# Patient Record
Sex: MALE | Race: WHITE | NOT HISPANIC OR LATINO | Employment: OTHER | URBAN - NONMETROPOLITAN AREA
[De-identification: names, ages, dates, MRNs, and addresses within clinical notes are randomized per-mention and may not be internally consistent; named-entity substitution may affect disease eponyms.]

---

## 2017-01-10 RX ORDER — GABAPENTIN 300 MG/1
300 CAPSULE ORAL
Qty: 90 CAP | Refills: 1 | Status: SHIPPED | OUTPATIENT
Start: 2017-01-10

## 2017-05-26 RX ORDER — MECOBAL/LEVOMEFOLAT CA/B6 PHOS 2-3-35 MG
TABLET ORAL
Qty: 180 TAB | Refills: 0 | Status: SHIPPED | OUTPATIENT
Start: 2017-05-26 | End: 2017-06-02 | Stop reason: SDUPTHER

## 2017-06-02 RX ORDER — MECOBAL/LEVOMEFOLAT CA/B6 PHOS 2-3-35 MG
1 TABLET ORAL 2 TIMES DAILY
Qty: 180 TAB | Refills: 0 | Status: SHIPPED | OUTPATIENT
Start: 2017-06-02

## 2017-07-17 ENCOUNTER — APPOINTMENT (OUTPATIENT)
Dept: MEDICAL GROUP | Facility: PHYSICIAN GROUP | Age: 77
End: 2017-07-17
Payer: MEDICARE

## 2017-11-07 ENCOUNTER — PATIENT OUTREACH (OUTPATIENT)
Dept: HEALTH INFORMATION MANAGEMENT | Facility: OTHER | Age: 77
End: 2017-11-07

## 2017-11-07 NOTE — PROGRESS NOTES
Attempt #:1    WebIZ Checked & Epic Updated: Yes  · WebIZ Recommendations: FLU, PREVNAR (PCV13) , TDAP and ZOSTAVAX (Shingles)  · Is patient due for Tdap? YES. Patient was not notified of copay/out of pocket cost.  · Is patient due for Shingles? YES. Patient was not notified of copay/out of pocket cost.  [facility]Connect Verified: no  Verify PCP: yes    Communication Preference Obtained: yes     Review Care Team: yes    Annual Wellness Visit Scheduling  1. Scheduling Status:Scheduled       Care Gap Scheduling (Attempt to Schedule EACH Overdue Care Gap!)     Health Maintenance Due   Topic Date Due   • IMM DTaP/Tdap/Td Vaccine (1 - Tdap) 12/15/1959   • IMM ZOSTER VACCINE  12/15/1998   • IMM PNEUMOCOCCAL 65+ (ADULT) LOW/MEDIUM RISK SERIES (1 of 2 - PCV13) 12/15/2003   • Annual Wellness Visit  12/22/2016   • IMM INFLUENZA (1) 09/01/2017        Scheduled patient for Annual Wellness Visit and Immunizations: FLU, PREVNAR (PCV13) , TDAP and ZOSTAVAX (Shingles)       Clerts! Activation: declined  Clerts! Miya: no  Virtual Visits: no  Opt In to Text Messages: no

## 2017-12-14 ENCOUNTER — OFFICE VISIT (OUTPATIENT)
Dept: MEDICAL GROUP | Facility: PHYSICIAN GROUP | Age: 77
End: 2017-12-14
Payer: MEDICARE

## 2017-12-14 ENCOUNTER — APPOINTMENT (OUTPATIENT)
Dept: MEDICAL GROUP | Facility: PHYSICIAN GROUP | Age: 77
End: 2017-12-14
Payer: MEDICARE

## 2017-12-14 VITALS
HEART RATE: 80 BPM | HEIGHT: 68 IN | SYSTOLIC BLOOD PRESSURE: 162 MMHG | DIASTOLIC BLOOD PRESSURE: 74 MMHG | BODY MASS INDEX: 31.67 KG/M2 | RESPIRATION RATE: 16 BRPM | TEMPERATURE: 97.9 F | OXYGEN SATURATION: 94 % | WEIGHT: 209 LBS

## 2017-12-14 DIAGNOSIS — Z00.00 MEDICARE ANNUAL WELLNESS VISIT, SUBSEQUENT: Primary | ICD-10-CM

## 2017-12-14 DIAGNOSIS — R73.03 PREDIABETES: ICD-10-CM

## 2017-12-14 DIAGNOSIS — M54.5 CHRONIC LOW BACK PAIN, UNSPECIFIED BACK PAIN LATERALITY, WITH SCIATICA PRESENCE UNSPECIFIED: ICD-10-CM

## 2017-12-14 DIAGNOSIS — I67.9 CEREBRAL VASCULAR DISEASE: ICD-10-CM

## 2017-12-14 DIAGNOSIS — M54.16 LUMBAR RADICULOPATHY: ICD-10-CM

## 2017-12-14 DIAGNOSIS — I10 ESSENTIAL HYPERTENSION: ICD-10-CM

## 2017-12-14 DIAGNOSIS — G25.81 RLS (RESTLESS LEGS SYNDROME): ICD-10-CM

## 2017-12-14 DIAGNOSIS — F03.90 DEMENTIA WITHOUT BEHAVIORAL DISTURBANCE, UNSPECIFIED DEMENTIA TYPE: ICD-10-CM

## 2017-12-14 DIAGNOSIS — R29.898 LEFT HAND WEAKNESS: ICD-10-CM

## 2017-12-14 DIAGNOSIS — G57.93 NEUROPATHY OF BOTH FEET: ICD-10-CM

## 2017-12-14 DIAGNOSIS — Z86.73 HISTORY OF STROKE: ICD-10-CM

## 2017-12-14 DIAGNOSIS — G89.29 CHRONIC LOW BACK PAIN, UNSPECIFIED BACK PAIN LATERALITY, WITH SCIATICA PRESENCE UNSPECIFIED: ICD-10-CM

## 2017-12-14 DIAGNOSIS — I65.21 STENOSIS OF RIGHT CAROTID ARTERY: ICD-10-CM

## 2017-12-14 PROCEDURE — G0439 PPPS, SUBSEQ VISIT: HCPCS | Performed by: NURSE PRACTITIONER

## 2017-12-14 RX ORDER — DONEPEZIL HYDROCHLORIDE 5 MG/1
5 TABLET, FILM COATED ORAL
Qty: 90 TAB | Refills: 1 | Status: SHIPPED | OUTPATIENT
Start: 2017-12-14

## 2017-12-14 ASSESSMENT — PATIENT HEALTH QUESTIONNAIRE - PHQ9: CLINICAL INTERPRETATION OF PHQ2 SCORE: 0

## 2017-12-14 NOTE — PROGRESS NOTES
Chief Complaint   Patient presents with   • Annual Exam     AWV         HPI:  Adonis is a 76 y.o. here for Medicare Annual Wellness Visit    Cerebral vascular disease  Mrs. a chronic condition, stable. Patient does have past history of stroke, however he does not recall having this. Does not appear he is followed by neurology.    Chronic low back pain  This is a chronic condition, he has low back pain with radiculopathy. However he is not complaining of pain today. He is supposed to be on gabapentin for this. It does not appear he is taking medications consistently.    Dementia without behavioral disturbance  This is a chronic condition. He continues on Aricept 5 mg daily. He does not recall any acute changes, however he did come to this appointment without his wife.    Essential hypertension  This is a chronic condition, uncontrolled. Patient not taking medications consistently.    History of stroke  Patient does have history of stroke in 2007. He does have residual left hand weakness and does continue on Plavix. However upon discussing this with patient, it appears he is not taking medications consistently. He is going to follow-up for medication management appointment    Left hand weakness  See additional note on history of stroke.    Lumbar radiculopathy  This is chronic, stable. He usually takes gabapentin for this, but he has not been taking medications consistently.    Neuropathy of both feet  This is a chronic condition, status of control unknown as patient does not report neuropathy of bilateral feet. He is supposed to be on gabapentin but is not taking consistently.    Prediabetes  Patient has history of prediabetes, status of control unknown as he has not had labs done for 2 years. The last time this was checked in 2016 it was 5.6%. He is due for labs.    RLS (restless legs syndrome)  This is a chronic condition, status of control unknown. He does not report taking medications consistently. He does not  report symptoms of restless leg syndrome.    Stenosis of right carotid artery  This is a chronic condition, he is unclear whether or not he is followed by cardiology. Patient does have dementia and does present to his appointment alone without his wife. He does deny symptoms though.        Patient Active Problem List    Diagnosis Date Noted   • Neuropathy of both feet 05/31/2016   • Essential hypertension 04/25/2016   • History of stroke 07/17/2015   • Left hand weakness 07/17/2015   • Prediabetes 07/17/2015   • Chronic low back pain 07/17/2015   • Lumbar radiculopathy 07/17/2015   • RLS (restless legs syndrome) 07/17/2015   • Dementia without behavioral disturbance 07/17/2015   • Cerebral vascular disease 07/17/2015   • Stenosis of right carotid artery 07/17/2015       Current Outpatient Prescriptions   Medication Sig Dispense Refill   • L-Methylfolate-B6-B12 3-35-2 MG Tab Take 1 Tab by mouth 2 Times a Day. 180 Tab 0   • clopidogrel (PLAVIX) 75 MG Tab TAKE 1 TABLET BY MOUTH EVERY DAY 90 Tab 0   • pramipexole (MIRAPEX) 1 MG Tab Take 1 Tab by mouth every bedtime. 90 Tab 3   • metformin (GLUCOPHAGE) 500 MG Tab Take 1 Tab by mouth every day. 90 Tab 1   • donepezil (ARICEPT) 5 MG Tab Take 1 Tab by mouth every day. 90 Tab 1   • gabapentin (NEURONTIN) 300 MG Cap Take 1 Cap by mouth every day. 90 Cap 1   • chlorthalidone (HYGROTON) 25 MG Tab TAKE 1 TABLET BY MOUTH EVERY DAY 90 Tab 3   • simvastatin (ZOCOR) 40 MG Tab Take 1 Tab by mouth every evening. 90 Tab 3   • gabapentin (NEURONTIN) 300 MG Cap TAKE 1 CAPSULE BY MOUTH EVERY DAY 90 Cap 3     No current facility-administered medications for this visit.         Patient is taking medications as noted in medication list.  Current supplements as per medication list.     Allergies: Patient has no known allergies.    Current social contact/activities: NO, due to  Advancing dementia    Is patient current with immunizations? No, due for PREVNAR (PCV13) , TDAP and ZOSTAVAX  (Shingles). Patient is interested in receiving NONE today.    He  reports that he quit smoking about 34 years ago. His smoking use included Cigarettes. He has a 20.00 pack-year smoking history. He has never used smokeless tobacco. He reports that he drinks alcohol. He reports that he does not use drugs.  Counseling given: Not Answered        DPA/Advanced directive: Patient has Advanced Directive on file.     ROS:    Gait: Uses no assistive device   Ostomy: no   Other tubes: no   Amputations: no   Chronic oxygen use no   Last eye exam 2015   Wears hearing aids: no   : Denies any urinary leakage during the last 6 months      Screening:        Depression Screening    Little interest or pleasure in doing things?  0 - not at all  Feeling down, depressed, or hopeless? 0 - not at all  Patient Health Questionnaire Score: 0    If depressive symptoms identified deferred to follow up visit unless specifically addressed in assessment and plan.    Interpretation of PHQ-9 Total Score   Score Severity   1-4 No Depression   5-9 Mild Depression   10-14 Moderate Depression   15-19 Moderately Severe Depression   20-27 Severe Depression    Screening for Cognitive Impairment    Three Minute Recall (apple, watch, estefania)  0/3    Draw clock face with all 12 numbers set to the hand to show 10 minutes past 11 o'clock  1    If cognitive concerns identified, deferred for follow up unless specifically addressed in assessment and plan.    Fall Risk Assessment    Has the patient had two or more falls in the last year or any fall with injury in the last year?  No  If fall risk identified, deferred for follow up unless specifically addressed in assessment and plan.    Safety Assessment    Throw rugs on floor.  No  Handrails on all stairs.  No  Good lighting in all hallways.  Yes  Difficulty hearing.  No  Patient counseled about all safety risks that were identified.    Functional Assessment ADLs    Are there any barriers preventing you from  cooking for yourself or meeting nutritional needs?  No.    Are there any barriers preventing you from driving safely or obtaining transportation?  No.    Are there any barriers preventing you from using a telephone or calling for help?  No.    Are there any barriers preventing you from shopping?  No.    Are there any barriers preventing you from taking care of your own finances?  No.    Are there any barriers preventing you from managing your medications?  No.    Are you currently engaging any exercise or physical activity?  Yes.  Walking    Health Maintenance Summary                IMM DTaP/Tdap/Td Vaccine Overdue 12/15/1957     IMM ZOSTER VACCINE Overdue 12/15/1998     IMM PNEUMOCOCCAL 65+ (ADULT) LOW/MEDIUM RISK SERIES Overdue 12/15/2003     Annual Wellness Visit Overdue 12/22/2016      Done 12/22/2015      Patient has more history with this topic...    IMM INFLUENZA Overdue 9/1/2017           Patient Care Team:  TRISHA Russell as PCP - General (Family Medicine)    Social History   Substance Use Topics   • Smoking status: Former Smoker     Packs/day: 1.00     Years: 20.00     Types: Cigarettes     Quit date: 7/1/1983   • Smokeless tobacco: Never Used   • Alcohol use Yes      Comment: wine, 1-2 glasses a day     Family History   Problem Relation Age of Onset   • Diabetes Mother    • Diabetes Sister    • Cancer Neg Hx    • Heart Disease Neg Hx    • Stroke Neg Hx      He  has a past medical history of Allergy, unspecified not elsewhere classified; GERD (gastroesophageal reflux disease); Hyperlipidemia; Hypertension; Pneumonia; Psychiatric problem; Stroke (CMS-HCC) (2007); Type II or unspecified type diabetes mellitus without mention of complication, not stated as uncontrolled; and Unspecified cataract.   Past Surgical History:   Procedure Laterality Date   • VENTRAL HERNIA REPAIR N/A 8/24/2015    Procedure: VENTRAL HERNIA REPAIR;  Surgeon: John H Ganser, M.D.;  Location: SURGERY [facility];   "Service:    • MASS EXCISION GENERAL Right 8/24/2015    Procedure: MASS EXCISION GENERAL-SUBCUTANEOUS NECK;  Surgeon: John H Ganser, M.D.;  Location: SURGERY [facility];  Service:    • CHOLECYSTECTOMY     • HERNIA REPAIR     • KNEE ARTHROPLASTY TOTAL      right           Exam:     Blood pressure (!) 162/74, pulse 80, temperature 36.6 °C (97.9 °F), resp. rate 16, height 1.715 m (5' 7.5\"), weight 94.8 kg (209 lb), SpO2 94 %. Body mass index is 32.25 kg/m².    Hearing fair.    Dentition upper and lower dentures  Alert, oriented in no acute distress.  Eye contact is good, speech goal directed, affect calm      Assessment and Plan. The following treatment and monitoring plan is recommended:  Follow up in 2 months with labs before visit, bring wife to follow up appt. Take medications as prescribed  No diagnosis found.      Services suggested: Referral to Pharmac[facility]  Health Care Screening recommendations as per orders if indicated.  Referrals offered: PT/OT/Nutrition counseling/Behavioral Health/Smoking cessation as per orders if indicated.    Discussion today about general wellness and lifestyle habits:    · Prevent falls and reduce trip hazards; Cautioned about securing or removing rugs.  · Have a working fire alarm and carbon monoxide detector;   · Engage in regular physical activity and social activities       Follow-up: No Follow-up on file.    "

## 2017-12-15 ENCOUNTER — TELEPHONE (OUTPATIENT)
Dept: MEDICAL GROUP | Facility: PHYSICIAN GROUP | Age: 77
End: 2017-12-15

## 2017-12-15 NOTE — ASSESSMENT & PLAN NOTE
This is a chronic condition. He continues on Aricept 5 mg daily. He does not recall any acute changes, however he did come to this appointment without his wife.

## 2017-12-15 NOTE — ASSESSMENT & PLAN NOTE
This is a chronic condition, status of control unknown as patient does not report neuropathy of bilateral feet. He is supposed to be on gabapentin but is not taking consistently.

## 2017-12-15 NOTE — ASSESSMENT & PLAN NOTE
Patient does have history of stroke in 2007. He does have residual left hand weakness and does continue on Plavix. However upon discussing this with patient, it appears he is not taking medications consistently. He is going to follow-up for medication management appointment

## 2017-12-15 NOTE — ASSESSMENT & PLAN NOTE
This is a chronic condition, he is unclear whether or not he is followed by cardiology. Patient does have dementia and does present to his appointment alone without his wife. He does deny symptoms though.

## 2017-12-15 NOTE — ASSESSMENT & PLAN NOTE
Patient has history of prediabetes, status of control unknown as he has not had labs done for 2 years. The last time this was checked in 2016 it was 5.6%. He is due for labs.

## 2017-12-15 NOTE — ASSESSMENT & PLAN NOTE
This is a chronic condition, he has low back pain with radiculopathy. However he is not complaining of pain today. He is supposed to be on gabapentin for this. It does not appear he is taking medications consistently.

## 2017-12-15 NOTE — ASSESSMENT & PLAN NOTE
This is chronic, stable. He usually takes gabapentin for this, but he has not been taking medications consistently.

## 2017-12-15 NOTE — ASSESSMENT & PLAN NOTE
Mrs. a chronic condition, stable. Patient does have past history of stroke, however he does not recall having this. Does not appear he is followed by neurology.

## 2017-12-15 NOTE — TELEPHONE ENCOUNTER
Adonis in office stating Laura does not have his RX from yesterday.  Contacted [facility], both RX ready for .  Adonis notified

## 2018-12-12 ENCOUNTER — PATIENT OUTREACH (OUTPATIENT)
Dept: HEALTH INFORMATION MANAGEMENT | Facility: OTHER | Age: 78
End: 2018-12-12

## 2018-12-12 NOTE — PROGRESS NOTES
Call transfered from [facility] for pre visit planning. The appt was scheduled by pt's daughter in law. Pt needs a physical for drivers license renewal I let his daughter in law know we had to change the appointment from an AWV to an annual preventative appt in order to have a physical done. Appt changed.

## 2018-12-13 ENCOUNTER — OFFICE VISIT (OUTPATIENT)
Dept: MEDICAL GROUP | Facility: PHYSICIAN GROUP | Age: 78
End: 2018-12-13
Payer: MEDICARE

## 2018-12-13 VITALS
OXYGEN SATURATION: 98 % | HEIGHT: 68 IN | SYSTOLIC BLOOD PRESSURE: 136 MMHG | HEART RATE: 76 BPM | RESPIRATION RATE: 16 BRPM | WEIGHT: 193 LBS | DIASTOLIC BLOOD PRESSURE: 78 MMHG | BODY MASS INDEX: 29.25 KG/M2 | TEMPERATURE: 97.5 F

## 2018-12-13 DIAGNOSIS — F03.90 DEMENTIA WITHOUT BEHAVIORAL DISTURBANCE, UNSPECIFIED DEMENTIA TYPE: ICD-10-CM

## 2018-12-13 DIAGNOSIS — I67.9 CEREBRAL VASCULAR DISEASE: ICD-10-CM

## 2018-12-13 DIAGNOSIS — Z86.73 HISTORY OF STROKE: ICD-10-CM

## 2018-12-13 PROCEDURE — 99214 OFFICE O/P EST MOD 30 MIN: CPT | Performed by: NURSE PRACTITIONER

## 2018-12-13 ASSESSMENT — PATIENT HEALTH QUESTIONNAIRE - PHQ9: CLINICAL INTERPRETATION OF PHQ2 SCORE: 0

## 2018-12-13 NOTE — PROGRESS NOTES
Chief Complaint   Patient presents with   • Annual Exam     DMV physical         This is a 77 y.o.male patient that presents today with the following:DMV exam    Dementia without behavioral disturbance  This is a chronic condition, appears to be progressing. Not currently followed by neurology, but is agreeable to referral. He is requesting DMV paperwork be filled out stating he is safe to drive. I have genet discussion with pt and wife stating that I am not comfortable with do this because of worsening condition until he can be seen by neurology.   Pt oriented to place and name, but continues to forget he is on medications. He is unable to do most tasks on the MMSE.    History of stroke  Pt had stroke in 2007 and continues to have residual L hand weakness, continues on Plavix. He is becoming more forgetful (see additional notes). He has been referred to neurology.      No visits with results within 1 Month(s) from this visit.   Latest known visit with results is:   Hospital Outpatient Visit on 05/26/2016   Component Date Value   • Sodium 05/26/2016 140    • Potassium 05/26/2016 4.2    • Chloride 05/26/2016 105    • Co2 05/26/2016 29    • Anion Gap 05/26/2016 6.0    • Glucose 05/26/2016 123*   • Bun 05/26/2016 24*   • Creatinine 05/26/2016 0.78    • Calcium 05/26/2016 9.2    • AST(SGOT) 05/26/2016 19    • ALT(SGPT) 05/26/2016 20    • Alkaline Phosphatase 05/26/2016 54    • Total Bilirubin 05/26/2016 0.8    • Albumin 05/26/2016 4.1    • Total Protein 05/26/2016 6.9    • Globulin 05/26/2016 2.8    • A-G Ratio 05/26/2016 1.5    • Cholesterol,Tot 05/26/2016 178    • Triglycerides 05/26/2016 154*   • HDL 05/26/2016 60    • LDL 05/26/2016 87    • Glycohemoglobin 05/26/2016 5.6    • Est Avg Glucose 05/26/2016 114    • GFR If  05/26/2016 >60    • GFR If Non  [employer]meri* 05/26/2016 >60              Past Medical History:   Diagnosis Date   • Allergy, unspecified not elsewhere classified    • GERD  (gastroesophageal reflux disease)    • Hyperlipidemia    • Hypertension    • Pneumonia     3-4 per month   • Psychiatric problem    • Stroke (HCC) 2007    TIA, left sided weakness in left hand   • Type II or unspecified type diabetes mellitus without mention of complication, not stated as uncontrolled     borderline   • Unspecified cataract        Past Surgical History:   Procedure Laterality Date   • VENTRAL HERNIA REPAIR N/A 8/24/2015    Procedure: VENTRAL HERNIA REPAIR;  Surgeon: John H Ganser, M.D.;  Location: SURGERY [facility];  Service:    • MASS EXCISION GENERAL Right 8/24/2015    Procedure: MASS EXCISION GENERAL-SUBCUTANEOUS NECK;  Surgeon: John H Ganser, M.D.;  Location: SURGERY [facility];  Service:    • CHOLECYSTECTOMY     • HERNIA REPAIR     • KNEE ARTHROPLASTY TOTAL      right       Family History   Problem Relation Age of Onset   • Diabetes Mother    • Diabetes Sister    • Cancer Neg Hx    • Heart Disease Neg Hx    • Stroke Neg Hx        Patient has no known allergies.    Current Outpatient Prescriptions Ordered in [facility]   Medication Sig Dispense Refill   • metformin (GLUCOPHAGE) 500 MG Tab Take 1 Tab by mouth every day. 90 Tab 1   • L-Methylfolate-B6-B12 3-35-2 MG Tab Take 1 Tab by mouth 2 Times a Day. 180 Tab 0   • clopidogrel (PLAVIX) 75 MG Tab TAKE 1 TABLET BY MOUTH EVERY DAY 90 Tab 0   • chlorthalidone (HYGROTON) 25 MG Tab TAKE 1 TABLET BY MOUTH EVERY DAY 90 Tab 3   • simvastatin (ZOCOR) 40 MG Tab Take 1 Tab by mouth every evening. 90 Tab 3   • pramipexole (MIRAPEX) 1 MG Tab Take 1 Tab by mouth every bedtime. 90 Tab 3   • gabapentin (NEURONTIN) 300 MG Cap TAKE 1 CAPSULE BY MOUTH EVERY DAY 90 Cap 3   • donepezil (ARICEPT) 5 MG Tab Take 1 Tab by mouth every day. 90 Tab 1   • gabapentin (NEURONTIN) 300 MG Cap Take 1 Cap by mouth every day. 90 Cap 1     No current Epic-ordered facility-administered medications on file.        Constitutional ROS: No unexpected change in weight, No unexplained  "fevers, sweats, or chills  Pulmonary ROS: No chronic cough, sputum, or hemoptysis, No shortness of breath, No recent change in breathing  Cardiovascular ROS: No chest pain  Gastrointestinal ROS: No abdominal pain, No nausea, vomiting, diarrhea, or constipation  Musculoskeletal/Extremities ROS: No clubbing, No peripheral edema, No pain, redness or swelling on the joints  Neurologic ROS: positive per HPI    Physical exam:  /78 (BP Location: Right arm, Patient Position: Sitting, BP Cuff Size: Adult)   Pulse 76   Temp 36.4 °C (97.5 °F) (Temporal)   Resp 16   Ht 1.715 m (5' 7.5\")   Wt 87.5 kg (193 lb)   SpO2 98%   BMI 29.78 kg/m²   General Appearance: alert, no distress, overweight, well groomed  Skin: Skin color, texture, turgor normal. No rashes or lesions.  Lungs: negative findings: normal respiratory rate and rhythm, lungs clear to auscultation  Heart: negative. RRR without murmur, gallop, or rubs.  No ectopy.  Abdomen: Abdomen soft, non-tender. BS normal. No masses,  No organomegaly  Musculoskeletal: negative findings: no evidence of joint instability, no evidence of muscle atrophy, no deformities present  Neurologic: intact, oriented to self and place only, scores less that 10 on MMSE    Medical decision making/discussion: pt referred to neurology for further evaluation of worsening dementia. He is to take meds as prescribed. He was given filled out form for handicap placard only    Adonis was seen today for annual exam.    Diagnoses and all orders for this visit:    Dementia without behavioral disturbance, unspecified dementia type  -     REFERRAL TO NEUROLOGY    History of stroke  -     REFERRAL TO NEUROLOGY    Cerebral vascular disease  -     REFERRAL TO NEUROLOGY          Please note that this dictation was created using voice recognition software. I have made every reasonable attempt to correct obvious errors, but I expect that there are errors of grammar and possibly content that I did not " discover before finalizing the note.

## 2018-12-15 NOTE — ASSESSMENT & PLAN NOTE
This is a chronic condition, appears to be progressing. Not currently followed by neurology, but is agreeable to referral. He is requesting DMV paperwork be filled out stating he is safe to drive. I have genet discussion with pt and wife stating that I am not comfortable with do this because of worsening condition until he can be seen by neurology.   Pt oriented to place and name, but continues to forget he is on medications. He is unable to do most tasks on the MMSE.

## 2018-12-15 NOTE — ASSESSMENT & PLAN NOTE
Pt had stroke in 2007 and continues to have residual L hand weakness, continues on Plavix. He is becoming more forgetful (see additional notes). He has been referred to neurology.

## 2019-12-06 ENCOUNTER — TELEPHONE (OUTPATIENT)
Dept: MEDICAL GROUP | Facility: PHYSICIAN GROUP | Age: 79
End: 2019-12-06

## 2019-12-06 NOTE — TELEPHONE ENCOUNTER
Daughter Marina, called concerned about father's cognitive problems. Father lives alone and is unable to care for himself. They have filed an EPS several times but nothing came out of it. Patient can become combative at times and threatens to shoot people within the last 3 month. Patient does own guns and is they're concerned. Pt does have an appt 12/9/19 with Millicent Thomas APR-N.     I called [facility] dispatch regarding the threats of shooting people and owning guns. Dispatch will be coming in to talk to discuss this matter.       Deputy Iglesias came into the office and stated that there's nothing that they can do about this because its all heresay.

## 2019-12-09 ENCOUNTER — TELEPHONE (OUTPATIENT)
Dept: MEDICAL GROUP | Facility: PHYSICIAN GROUP | Age: 79
End: 2019-12-09

## 2019-12-09 NOTE — TELEPHONE ENCOUNTER
Spoke with pt's son and daughter-in-law regarding pt and his behavioral disturbances, severe dementia. Was supposed to come in for appt today, but refused to come in. Family is very concerned, does not feel he is safe at home, has access to guns, at one point was threatening family members with gun, waving it around.   They spoke with MA last Friday, they were advised to call police, but MA actually called police to make welfare check,  came into the office to tell MA there was nothing they could do as it was hearsay.   Family was advise that if they feel he is a danger to himself or others, they need to call the police and/or ambulance, strongly recommend to deny pt access to his guns--daughter states she will not take his guns away. Discussed with them that safety for all parties is paramount and that still should consider removing guns from the home.

## 2020-06-26 ENCOUNTER — APPOINTMENT (OUTPATIENT)
Dept: RADIOLOGY | Facility: MEDICAL CENTER | Age: 80
End: 2020-06-26
Attending: EMERGENCY MEDICINE
Payer: MEDICARE

## 2020-06-26 ENCOUNTER — HOSPITAL ENCOUNTER (EMERGENCY)
Facility: MEDICAL CENTER | Age: 80
End: 2020-06-26
Attending: EMERGENCY MEDICINE
Payer: MEDICARE

## 2020-06-26 VITALS
DIASTOLIC BLOOD PRESSURE: 88 MMHG | HEART RATE: 82 BPM | RESPIRATION RATE: 18 BRPM | BODY MASS INDEX: 28.79 KG/M2 | OXYGEN SATURATION: 98 % | TEMPERATURE: 98.6 F | WEIGHT: 190 LBS | HEIGHT: 68 IN | SYSTOLIC BLOOD PRESSURE: 145 MMHG

## 2020-06-26 DIAGNOSIS — F03.91 DEMENTIA WITH BEHAVIORAL DISTURBANCE, UNSPECIFIED DEMENTIA TYPE: ICD-10-CM

## 2020-06-26 LAB
ALBUMIN SERPL BCP-MCNC: 4.1 G/DL (ref 3.2–4.9)
ALBUMIN/GLOB SERPL: 1.4 G/DL
ALP SERPL-CCNC: 74 U/L (ref 30–99)
ALT SERPL-CCNC: 12 U/L (ref 2–50)
AMMONIA PLAS-SCNC: 15 UMOL/L (ref 11–45)
ANION GAP SERPL CALC-SCNC: 9 MMOL/L (ref 7–16)
AST SERPL-CCNC: 13 U/L (ref 12–45)
BASOPHILS # BLD AUTO: 0.3 % (ref 0–1.8)
BASOPHILS # BLD: 0.02 K/UL (ref 0–0.12)
BILIRUB SERPL-MCNC: 0.5 MG/DL (ref 0.1–1.5)
BUN SERPL-MCNC: 15 MG/DL (ref 8–22)
CALCIUM SERPL-MCNC: 9.1 MG/DL (ref 8.5–10.5)
CHLORIDE SERPL-SCNC: 100 MMOL/L (ref 96–112)
CO2 SERPL-SCNC: 27 MMOL/L (ref 20–33)
CREAT SERPL-MCNC: 0.56 MG/DL (ref 0.5–1.4)
EOSINOPHIL # BLD AUTO: 0.15 K/UL (ref 0–0.51)
EOSINOPHIL NFR BLD: 2.6 % (ref 0–6.9)
ERYTHROCYTE [DISTWIDTH] IN BLOOD BY AUTOMATED COUNT: 42.1 FL (ref 35.9–50)
GLOBULIN SER CALC-MCNC: 2.9 G/DL (ref 1.9–3.5)
GLUCOSE SERPL-MCNC: 185 MG/DL (ref 65–99)
HCT VFR BLD AUTO: 41.2 % (ref 42–52)
HGB BLD-MCNC: 14.3 G/DL (ref 14–18)
IMM GRANULOCYTES # BLD AUTO: 0.02 K/UL (ref 0–0.11)
IMM GRANULOCYTES NFR BLD AUTO: 0.3 % (ref 0–0.9)
LYMPHOCYTES # BLD AUTO: 1.2 K/UL (ref 1–4.8)
LYMPHOCYTES NFR BLD: 20.4 % (ref 22–41)
MCH RBC QN AUTO: 32.4 PG (ref 27–33)
MCHC RBC AUTO-ENTMCNC: 34.7 G/DL (ref 33.7–35.3)
MCV RBC AUTO: 93.2 FL (ref 81.4–97.8)
MONOCYTES # BLD AUTO: 0.54 K/UL (ref 0–0.85)
MONOCYTES NFR BLD AUTO: 9.2 % (ref 0–13.4)
NEUTROPHILS # BLD AUTO: 3.94 K/UL (ref 1.82–7.42)
NEUTROPHILS NFR BLD: 67.2 % (ref 44–72)
NRBC # BLD AUTO: 0 K/UL
NRBC BLD-RTO: 0 /100 WBC
PLATELET # BLD AUTO: 239 K/UL (ref 164–446)
PMV BLD AUTO: 9.4 FL (ref 9–12.9)
POTASSIUM SERPL-SCNC: 3.7 MMOL/L (ref 3.6–5.5)
PROT SERPL-MCNC: 7 G/DL (ref 6–8.2)
RBC # BLD AUTO: 4.42 M/UL (ref 4.7–6.1)
SODIUM SERPL-SCNC: 136 MMOL/L (ref 135–145)
WBC # BLD AUTO: 5.9 K/UL (ref 4.8–10.8)

## 2020-06-26 PROCEDURE — 70450 CT HEAD/BRAIN W/O DYE: CPT

## 2020-06-26 PROCEDURE — 99284 EMERGENCY DEPT VISIT MOD MDM: CPT | Performed by: PSYCHIATRY & NEUROLOGY

## 2020-06-26 PROCEDURE — 82140 ASSAY OF AMMONIA: CPT

## 2020-06-26 PROCEDURE — 99285 EMERGENCY DEPT VISIT HI MDM: CPT

## 2020-06-26 PROCEDURE — 85025 COMPLETE CBC W/AUTO DIFF WBC: CPT

## 2020-06-26 PROCEDURE — 36415 COLL VENOUS BLD VENIPUNCTURE: CPT

## 2020-06-26 PROCEDURE — 80053 COMPREHEN METABOLIC PANEL: CPT

## 2020-06-26 NOTE — ED NOTES
1:1 sitter at pt bedside. Patient sitting on edge of bed, requiring frequent redirection of current situation. Will continue to monitor.

## 2020-06-26 NOTE — ED NOTES
1:1 sitter at pt bedside. Pt still sitting on edge of bed, talking with sitter. RR even and unlabored. NAD.

## 2020-06-26 NOTE — DISCHARGE PLANNING
note:  Homehealth choice given by son for either [facility] or [facility] but son aware that HH will not see pt until he sees PCP. Son said pt refuses to see PCP. Faxed choice to [facility].     Son stated that he wants pt to ride taxi and he has a debit card and pay for the ride. Notified SW however it is being  determined whether  it is safe for pt to ride a taxi on his own since pt has dementia. SW supervisor aware. Alert team is determining.

## 2020-06-26 NOTE — ED NOTES
1:1 sitter at pt bedside. Pt walking around room, states he wants to go home to his wife. Pt educated that he must wait until [facility] assess him. Patient needs constant reinforcement of situation.

## 2020-06-26 NOTE — ED NOTES
Patient given lunch box. Pt sitting up in bed, eating. Waiting on CT scan at this time. Sitter at pt bedside.
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
## 2020-06-26 NOTE — ED NOTES
Unable to obtain med rec at this time. Emergency contact unavailable for interview. Pharmacies on record no current or active orders.
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
## 2020-06-26 NOTE — ED NOTES
Blood work drawn and sent to lab. Sitter at pt bedside. NAD. No needs at this time. Will continue to monitor.

## 2020-06-26 NOTE — ED NOTES
Pt disrobed, placed in 1 hospital gown and non-slip socks. Patient given 1 blanket, has 1 flat sheet on bed. Belongings placed in belongings bag, security called for belongings search. Items to be placed in locker for secure storage.
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
## 2020-06-27 NOTE — DISCHARGE PLANNING
note:  Cinthia gave ABN form back to CM because pt unable to sign due to dementia.   However, Sabrina TAVERAS wrote a note that JEFF Gray said that pt will pay.   Suzette received the ABN form and she agreed to  pt at 830pm.     Cinthia RN from Alert Team has cleared pt for dc home.     JEFF Gray aware that pt is being picked up at 830pm. She said that they will check on the pt and wife.

## 2020-06-27 NOTE — ED NOTES
Pt standing in doorway, educated that ride home will be to ER at 2030 this evening to take him home. Patient needs repetitive redirection.

## 2020-06-27 NOTE — DISCHARGE PLANNING
ABN form received from Suzette @ [facility] and given to Cinthia HILL for pt's signature.

## 2020-06-27 NOTE — DISCHARGE PLANNING
note:  Discussed transportation option with  supervisor Cyndy, she agrees that it is unsafe for pt to be dc via taxi so she asked CM to get a quote from [facility] to be paid for by family.     CM talked to Suzette at [facility] who gave a quote for $1763.00 which she is sure will not be covered by Medicare but Suzette wants family or pt to sign the ABN which would be a document for family to agree and pay for the ambulance transportation if not covered.     Faxed facesheet and PCS form to [facility] and Suzette from [facility] to email the ABN and notified SERGIO Vicente to have pt sign form .     Per Cinthia, she has notified son that [facility] transport will probably not be covered and the out of pocket cost will be $1763 and son apparently said that pt has money to pay for this.

## 2020-06-27 NOTE — DISCHARGE PLANNING
Received Choice form at 0800  Agency/Facility Name: [facility]   Referral sent per Choice form @ 0872

## 2020-06-27 NOTE — ED NOTES
Waiting on alert team and social work to arrange patient transport home. Patient given water to drink. Pt sitting on edge of bed. Pt denies needs. 1:1 sitter at pt bedside. Will continue to monitor.

## 2020-06-29 NOTE — DISCHARGE PLANNING
Agency/Facility Name: [facility]  Spoke To: Juliana  Outcome: Pt has no order for HH or PCP. Told service to disregard.

## 2021-01-11 DIAGNOSIS — Z23 NEED FOR VACCINATION: ICD-10-CM
